# Patient Record
Sex: FEMALE | Race: WHITE | HISPANIC OR LATINO | Employment: UNEMPLOYED | URBAN - METROPOLITAN AREA
[De-identification: names, ages, dates, MRNs, and addresses within clinical notes are randomized per-mention and may not be internally consistent; named-entity substitution may affect disease eponyms.]

---

## 2023-05-28 ENCOUNTER — NURSE TRIAGE (OUTPATIENT)
Dept: ADMINISTRATIVE | Facility: CLINIC | Age: 38
End: 2023-05-28

## 2023-05-29 PROBLEM — R10.9 ABDOMINAL PAIN: Status: ACTIVE | Noted: 2023-05-29

## 2023-05-29 NOTE — TELEPHONE ENCOUNTER
Reason for Disposition   Passed out (i.e., lost consciousness, collapsed and was not responding)    Additional Information   Negative: Shock suspected (e.g., cold/pale/clammy skin, too weak to stand, low BP, rapid pulse)   Negative: Difficult to awaken or acting confused (e.g., disoriented, slurred speech)    Protocols used: Colonoscopy Symptoms and Zlhgyruoe-L-ZB  Spouse called re pt drinking prep for colonoscopy kevin , pt vomiting and in pain and having continued nausea. Pt heard moaning in the background. Pt states she feels like she is going to pass out. Rec EMS. Pt agrees.

## 2023-05-29 NOTE — TELEPHONE ENCOUNTER
calling on behalf of patient who is not present. Reports emesis after drinking prep for colonoscopy tomorrow. He will have patient call back for further triage.     Reason for Disposition   [1] Caller is not with the adult (patient) AND [2] probable NON-URGENT symptoms    Protocols used: Information Only Call - No Triage-A-